# Patient Record
Sex: FEMALE
[De-identification: names, ages, dates, MRNs, and addresses within clinical notes are randomized per-mention and may not be internally consistent; named-entity substitution may affect disease eponyms.]

---

## 2019-07-29 ENCOUNTER — HOSPITAL ENCOUNTER (OUTPATIENT)
Dept: HOSPITAL 39 - LAB.O | Age: 44
End: 2019-07-29
Attending: NURSE PRACTITIONER
Payer: COMMERCIAL

## 2019-07-29 DIAGNOSIS — E11.65: Primary | ICD-10-CM

## 2020-06-05 ENCOUNTER — HOSPITAL ENCOUNTER (OUTPATIENT)
Dept: HOSPITAL 39 - YCFC.O | Age: 45
End: 2020-06-05
Attending: FAMILY MEDICINE
Payer: SELF-PAY

## 2020-06-05 DIAGNOSIS — E11.9: ICD-10-CM

## 2020-06-05 DIAGNOSIS — K76.0: Primary | ICD-10-CM

## 2020-06-05 NOTE — US
EXAM DESCRIPTION: 

Abdomen,Limited: ULTRASOUND.



CLINICAL HISTORY: 

RIGHT UPPER QUADRANT PAIN



COMPARISON: 

Ultrasound gallbladder June 2014. Images only.



TECHNIQUE: 

Transabdominal scanning: gray-scale mode.  Doppler mode.



FINDINGS: 

Gallbladder: normal size, shape, echogenicity; no intraluminal

stones or sludge. No fluid around the gallbladder. No wall

thickening. 2.4 mm. Non-tender with transducer pressure.



Common bile duct: caliber 5.7 mm within normal limits. 



Liver:  Increased echogenicity; contour liver capsule smooth

where seen. No fluid around the liver. Intrahepatic biliary ducts

normal caliber.  Doppler hepatopedal flow and normal caliber

portal vein..  Long axis right lobe 18.6 cm.



Pancreas: normal size and echogenicity.  Duct not seen. 



Proximal abdominal aorta: Normal caliber 2.1 cm.. 



IVC: visualized and normal caliber.



Right kidney: long axis measures 11.4 cm. Normal cortical

Echogenicity. Normal cortical thickness.  No echogenic stones or

hydronephrosis.



IMPRESSION: 

1. Normal ultrasound of the gallbladder with no wall thickening.

Nontender. Normal caliber of the common bile duct.

2. Steatosis of the liver with mild enlargement. Physiologic

vascularity. No ascites. Normal ultrasound of the pancreas.

3. Normal caliber of the proximal abdominal aorta and IVC.

Negative findings right kidney.



Electronically signed by:  Adolfo Almeida MD  6/5/2020 1:24 PM CDT

Workstation: [id number]

## 2020-09-28 ENCOUNTER — HOSPITAL ENCOUNTER (OUTPATIENT)
Dept: HOSPITAL 39 - YCFC.O | Age: 45
End: 2020-09-28
Attending: FAMILY MEDICINE
Payer: SELF-PAY

## 2020-09-28 DIAGNOSIS — E11.65: Primary | ICD-10-CM

## 2021-01-05 ENCOUNTER — HOSPITAL ENCOUNTER (OUTPATIENT)
Dept: HOSPITAL 39 - YCFC.O | Age: 46
End: 2021-01-05
Attending: NURSE PRACTITIONER
Payer: COMMERCIAL

## 2021-01-05 DIAGNOSIS — Z20.828: Primary | ICD-10-CM
